# Patient Record
Sex: FEMALE | Race: WHITE | NOT HISPANIC OR LATINO | Employment: STUDENT | URBAN - METROPOLITAN AREA
[De-identification: names, ages, dates, MRNs, and addresses within clinical notes are randomized per-mention and may not be internally consistent; named-entity substitution may affect disease eponyms.]

---
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
## 2024-04-25 ENCOUNTER — NURSE TRIAGE (OUTPATIENT)
Dept: NURSING | Facility: CLINIC | Age: 19
End: 2024-04-25

## 2024-04-25 ENCOUNTER — HOSPITAL ENCOUNTER (EMERGENCY)
Facility: CLINIC | Age: 19
Discharge: HOME | End: 2024-04-26
Attending: EMERGENCY MEDICINE | Admitting: EMERGENCY MEDICINE
Payer: COMMERCIAL

## 2024-04-25 DIAGNOSIS — N93.9 VAGINAL BLEEDING: ICD-10-CM

## 2024-04-25 LAB
ABO/RH(D): NORMAL
ANTIBODY SCREEN: NEGATIVE
SPECIMEN EXPIRATION DATE: NORMAL

## 2024-04-25 PROCEDURE — 99284 EMERGENCY DEPT VISIT MOD MDM: CPT | Performed by: EMERGENCY MEDICINE

## 2024-04-25 PROCEDURE — 99284 EMERGENCY DEPT VISIT MOD MDM: CPT | Mod: 25 | Performed by: EMERGENCY MEDICINE

## 2024-04-25 ASSESSMENT — COLUMBIA-SUICIDE SEVERITY RATING SCALE - C-SSRS
6. HAVE YOU EVER DONE ANYTHING, STARTED TO DO ANYTHING, OR PREPARED TO DO ANYTHING TO END YOUR LIFE?: NO
2. HAVE YOU ACTUALLY HAD ANY THOUGHTS OF KILLING YOURSELF IN THE PAST MONTH?: NO
1. IN THE PAST MONTH, HAVE YOU WISHED YOU WERE DEAD OR WISHED YOU COULD GO TO SLEEP AND NOT WAKE UP?: NO

## 2024-04-26 ENCOUNTER — APPOINTMENT (OUTPATIENT)
Dept: ULTRASOUND IMAGING | Facility: CLINIC | Age: 19
End: 2024-04-26
Attending: EMERGENCY MEDICINE
Payer: COMMERCIAL

## 2024-04-26 VITALS
SYSTOLIC BLOOD PRESSURE: 153 MMHG | HEART RATE: 95 BPM | DIASTOLIC BLOOD PRESSURE: 87 MMHG | TEMPERATURE: 98.1 F | OXYGEN SATURATION: 98 % | RESPIRATION RATE: 20 BRPM

## 2024-04-26 LAB
ALBUMIN SERPL BCG-MCNC: 4.6 G/DL (ref 3.5–5.2)
ALBUMIN UR-MCNC: NEGATIVE MG/DL
ALP SERPL-CCNC: 104 U/L (ref 40–150)
ALT SERPL W P-5'-P-CCNC: 21 U/L (ref 0–50)
ANION GAP SERPL CALCULATED.3IONS-SCNC: 12 MMOL/L (ref 7–15)
APPEARANCE UR: CLEAR
AST SERPL W P-5'-P-CCNC: 32 U/L (ref 0–35)
BASOPHILS # BLD AUTO: 0.1 10E3/UL (ref 0–0.2)
BASOPHILS NFR BLD AUTO: 1 %
BILIRUB SERPL-MCNC: 0.3 MG/DL
BILIRUB UR QL STRIP: NEGATIVE
BUN SERPL-MCNC: 12.8 MG/DL (ref 6–20)
CALCIUM SERPL-MCNC: 9.8 MG/DL (ref 8.6–10)
CHLORIDE SERPL-SCNC: 105 MMOL/L (ref 98–107)
COLOR UR AUTO: ABNORMAL
CREAT SERPL-MCNC: 0.84 MG/DL (ref 0.51–0.95)
DEPRECATED HCO3 PLAS-SCNC: 24 MMOL/L (ref 22–29)
EGFRCR SERPLBLD CKD-EPI 2021: >90 ML/MIN/1.73M2
EOSINOPHIL # BLD AUTO: 0.1 10E3/UL (ref 0–0.7)
EOSINOPHIL NFR BLD AUTO: 1 %
ERYTHROCYTE [DISTWIDTH] IN BLOOD BY AUTOMATED COUNT: 15.4 % (ref 10–15)
GLUCOSE SERPL-MCNC: 149 MG/DL (ref 70–99)
GLUCOSE UR STRIP-MCNC: NEGATIVE MG/DL
HCG SERPL QL: NEGATIVE
HCT VFR BLD AUTO: 37.4 % (ref 35–47)
HGB BLD-MCNC: 11.8 G/DL (ref 11.7–15.7)
HGB UR QL STRIP: NEGATIVE
IMM GRANULOCYTES # BLD: 0 10E3/UL
IMM GRANULOCYTES NFR BLD: 0 %
KETONES UR STRIP-MCNC: NEGATIVE MG/DL
LEUKOCYTE ESTERASE UR QL STRIP: ABNORMAL
LIPASE SERPL-CCNC: 33 U/L (ref 13–60)
LYMPHOCYTES # BLD AUTO: 3.9 10E3/UL (ref 0.8–5.3)
LYMPHOCYTES NFR BLD AUTO: 36 %
MCH RBC QN AUTO: 27.7 PG (ref 26.5–33)
MCHC RBC AUTO-ENTMCNC: 31.6 G/DL (ref 31.5–36.5)
MCV RBC AUTO: 88 FL (ref 78–100)
MONOCYTES # BLD AUTO: 1.1 10E3/UL (ref 0–1.3)
MONOCYTES NFR BLD AUTO: 10 %
MUCOUS THREADS #/AREA URNS LPF: PRESENT /LPF
NEUTROPHILS # BLD AUTO: 5.7 10E3/UL (ref 1.6–8.3)
NEUTROPHILS NFR BLD AUTO: 52 %
NITRATE UR QL: NEGATIVE
NRBC # BLD AUTO: 0 10E3/UL
NRBC BLD AUTO-RTO: 0 /100
PH UR STRIP: 5 [PH] (ref 5–7)
PLATELET # BLD AUTO: 312 10E3/UL (ref 150–450)
POTASSIUM SERPL-SCNC: 4.2 MMOL/L (ref 3.4–5.3)
PROT SERPL-MCNC: 7.7 G/DL (ref 6.4–8.3)
RADIOLOGIST FLAGS: NORMAL
RBC # BLD AUTO: 4.26 10E6/UL (ref 3.8–5.2)
RBC URINE: 2 /HPF
SODIUM SERPL-SCNC: 141 MMOL/L (ref 135–145)
SP GR UR STRIP: 1.01 (ref 1–1.03)
SQUAMOUS EPITHELIAL: 1 /HPF
UROBILINOGEN UR STRIP-MCNC: NORMAL MG/DL
WBC # BLD AUTO: 10.9 10E3/UL (ref 4–11)
WBC URINE: 4 /HPF

## 2024-04-26 PROCEDURE — 83690 ASSAY OF LIPASE: CPT | Performed by: EMERGENCY MEDICINE

## 2024-04-26 PROCEDURE — 76830 TRANSVAGINAL US NON-OB: CPT | Mod: 26 | Performed by: STUDENT IN AN ORGANIZED HEALTH CARE EDUCATION/TRAINING PROGRAM

## 2024-04-26 PROCEDURE — 76856 US EXAM PELVIC COMPLETE: CPT | Mod: 26 | Performed by: STUDENT IN AN ORGANIZED HEALTH CARE EDUCATION/TRAINING PROGRAM

## 2024-04-26 PROCEDURE — 84703 CHORIONIC GONADOTROPIN ASSAY: CPT | Performed by: EMERGENCY MEDICINE

## 2024-04-26 PROCEDURE — 76856 US EXAM PELVIC COMPLETE: CPT

## 2024-04-26 PROCEDURE — 76830 TRANSVAGINAL US NON-OB: CPT

## 2024-04-26 PROCEDURE — 81001 URINALYSIS AUTO W/SCOPE: CPT | Performed by: EMERGENCY MEDICINE

## 2024-04-26 PROCEDURE — 86900 BLOOD TYPING SEROLOGIC ABO: CPT | Performed by: EMERGENCY MEDICINE

## 2024-04-26 PROCEDURE — 36415 COLL VENOUS BLD VENIPUNCTURE: CPT | Performed by: EMERGENCY MEDICINE

## 2024-04-26 PROCEDURE — 85041 AUTOMATED RBC COUNT: CPT | Performed by: EMERGENCY MEDICINE

## 2024-04-26 PROCEDURE — 80053 COMPREHEN METABOLIC PANEL: CPT | Performed by: EMERGENCY MEDICINE

## 2024-04-26 ASSESSMENT — ACTIVITIES OF DAILY LIVING (ADL): ADLS_ACUITY_SCORE: 33

## 2024-04-26 NOTE — DISCHARGE INSTRUCTIONS
Follow-up with your primary care doctor.  If you develop any new or worsening symptoms such as lightheadedness, dizziness, or worsening bleeding you should return the emergency room to be reevaluated.  You can take Motrin or Tylenol for pain.

## 2024-04-26 NOTE — ED TRIAGE NOTES
"Patient presents to ED with CC of abdominal pain and bilateral leg pain almost describes as \"growing pain.\"  Patient states today she had vaginal bleeding but her period ended a week ago. Describes it as \"brighter red blood and thin\" spotting.     Possible chance of pregnancy       Hx of DM1   "

## 2024-04-26 NOTE — TELEPHONE ENCOUNTER
"Nurse Triage SBAR    Is this a 2nd Level Triage? NO    Situation: Unusual Vaginal Bleeding, Abdominal and Leg Pain    Background: :Patient reports that she had her menstrual period for a week and ended a week ago.    Assessment: Patient calling to report constant mild lower abdominal pain and leg pain increasing to 7/10 intermittently. She reports cramping of abdomen and \"growing pains\" in legs. She reports onset of vaginal bleeding this evening, she reports filling a tampon and is now having spotting. She denies dizziness, confusion, severe weakness, or severe abdominal pain.    Protocol Recommended Disposition:   According to the protocol, patient should see provider within 4 hours (or PCP triage), no [facility] PCP, advised to go to ED.  Care advice given. Patient verbalizes understanding and agrees with plan of care. Plans to go to [place] ED.    Allison Stout RN  04/25/24 9:55 PM  [facility] Nurse Advisor    Reason for Disposition   [1] Constant abdominal pain AND [2] present > 2 hours    Additional Information   Negative: Shock suspected (e.g., cold/pale/clammy skin, too weak to stand, low BP, rapid pulse)   Negative: Difficult to awaken or acting confused (e.g., disoriented, slurred speech)   Negative: Passed out (i.e., lost consciousness, collapsed and was not responding)   Negative: Sounds like a life-threatening emergency to the triager   Negative: Followed a genital area injury (e.g., vagina, vulva)   Negative: Pregnant 20 or more weeks   Negative: Pregnant < 20 weeks  (less than 5 months)   Negative: Postpartum (from 0 to 6 weeks after delivery)   Negative: Bleeding occurring > 12 months after menopause   Negative: Bleeding from sexual abuse or rape   Negative: [1] Vaginal discharge is main symptom AND [2] small amount of blood   Negative: SEVERE abdominal pain   Negative: SEVERE dizziness (e.g., unable to stand, requires support to walk, feels like passing out now)   Negative: SEVERE vaginal " bleeding (e.g., soaking 2 pads or tampons per hour and present 2 or more hours; 1 menstrual cup every 2 hours)   Negative: Patient sounds very sick or weak to the triager   Negative: MODERATE vaginal bleeding (e.g., soaking 1 pad or tampon per hour and present > 6 hours; 1 menstrual cup every 6 hours)    Protocols used: Vaginal Bleeding - [id number]

## 2024-04-26 NOTE — ED PROVIDER NOTES
[facility] DEPARTMENT (Formerly [facility])    4/25/24       ED PROVIDER NOTE    History     Chief Complaint   Patient presents with    Abdominal Pain    Vaginal Bleeding     HPI  Alicia Mayers is a 18 year old female with PMH notable for DM1 who presents to the Emergency Department with abdominal pain, bilateral leg pain, and vaginal bleeding.  The patient reports about 4 days of intermittent lower abdominal cramps, bilateral leg achiness.  She has been taking ibuprofen for symptoms intermittently.  She states that then today she developed some vaginal bleeding which initially started off heavy but then became lighter and more spotting like.    She states that her periods are regular and her last period was about 1 week ago.  She is not on birth control.  She is sexually active.  She denies concern for STDs, denies any discharge.  She denies any recent fevers, chills, nausea.  She denies any urinary complaints.    Past Medical History  History reviewed. No pertinent past medical history.  History reviewed. No pertinent surgical history.  No current outpatient medications on file.    No Known Allergies  Family History  History reviewed. No pertinent family history.  Social History   Social History     Tobacco Use    Smoking status: Never    Smokeless tobacco: Never   Substance Use Topics    Alcohol use: Never     Comment: occasionally    Drug use: Never         A complete review of systems was performed with pertinent positives and negatives noted in the HPI, and all other systems negative.    Physical Exam   BP: (!) 153/87  Pulse: 100  Temp: 98.1  F (36.7  C)  Resp: 16  SpO2: 99 %  Physical Exam  Constitutional:       General: She is not in acute distress.     Appearance: She is not toxic-appearing.   HENT:      Nose: Nose normal. No congestion.      Mouth/Throat:      Mouth: Mucous membranes are moist.      Pharynx: Oropharynx is clear.   Eyes:      Extraocular Movements: Extraocular movements  intact.      Pupils: Pupils are equal, round, and reactive to light.   Cardiovascular:      Rate and Rhythm: Normal rate and regular rhythm.      Heart sounds: No murmur heard.     No gallop.   Pulmonary:      Effort: Pulmonary effort is normal. No respiratory distress.      Breath sounds: No wheezing or rales.   Abdominal:      General: There is no distension.      Palpations: Abdomen is soft.      Tenderness: There is no abdominal tenderness. There is no rebound.   Musculoskeletal:         General: Normal range of motion.      Cervical back: Normal range of motion.   Skin:     General: Skin is warm and dry.   Neurological:      General: No focal deficit present.      Mental Status: She is alert and oriented to person, place, and time.           ED Course, Procedures, & Data      Procedures               Results for orders placed or performed during the hospital encounter of 04/25/24   US Pelvic Complete with Transvaginal     Status: None (Preliminary result)    Impression    RESIDENT PRELIMINARY INTERPRETATION  IMPRESSION:   1. No ovarian torsion.  2. Endometrial stripe measures 11 mm, which may correspond with  secretory phase.   HCG qualitative pregnancy (blood)     Status: Normal   Result Value Ref Range    hCG Serum Qualitative Negative Negative   Comprehensive metabolic panel     Status: Abnormal   Result Value Ref Range    Sodium 141 135 - 145 mmol/L    Potassium 4.2 3.4 - 5.3 mmol/L    Carbon Dioxide (CO2) 24 22 - 29 mmol/L    Anion Gap 12 7 - 15 mmol/L    Urea Nitrogen 12.8 6.0 - 20.0 mg/dL    Creatinine 0.84 0.51 - 0.95 mg/dL    GFR Estimate >90 >60 mL/min/1.73m2    Calcium 9.8 8.6 - 10.0 mg/dL    Chloride 105 98 - 107 mmol/L    Glucose 149 (H) 70 - 99 mg/dL    Alkaline Phosphatase 104 40 - 150 U/L    AST 32 0 - 35 U/L    ALT 21 0 - 50 U/L    Protein Total 7.7 6.4 - 8.3 g/dL    Albumin 4.6 3.5 - 5.2 g/dL    Bilirubin Total 0.3 <=1.2 mg/dL   Lipase     Status: Normal   Result Value Ref Range    Lipase 33  13 - 60 U/L   UA with Microscopic reflex to Culture     Status: Abnormal    Specimen: Urine, Clean Catch   Result Value Ref Range    Color Urine Straw Colorless, Straw, Light Yellow, Yellow    Appearance Urine Clear Clear    Glucose Urine Negative Negative mg/dL    Bilirubin Urine Negative Negative    Ketones Urine Negative Negative mg/dL    Specific Gravity Urine 1.013 1.003 - 1.035    Blood Urine Negative Negative    pH Urine 5.0 5.0 - 7.0    Protein Albumin Urine Negative Negative mg/dL    Urobilinogen Urine Normal Normal, 2.0 mg/dL    Nitrite Urine Negative Negative    Leukocyte Esterase Urine Trace (A) Negative    Mucus Urine Present (A) None Seen /LPF    RBC Urine 2 <=2 /HPF    WBC Urine 4 <=5 /HPF    Squamous Epithelials Urine 1 <=1 /HPF    Narrative    Urine Culture not indicated   CBC with platelets and differential     Status: Abnormal   Result Value Ref Range    WBC Count 10.9 4.0 - 11.0 10e3/uL    RBC Count 4.26 3.80 - 5.20 10e6/uL    Hemoglobin 11.8 11.7 - 15.7 g/dL    Hematocrit 37.4 35.0 - 47.0 %    MCV 88 78 - 100 fL    MCH 27.7 26.5 - 33.0 pg    MCHC 31.6 31.5 - 36.5 g/dL    RDW 15.4 (H) 10.0 - 15.0 %    Platelet Count 312 150 - 450 10e3/uL    % Neutrophils 52 %    % Lymphocytes 36 %    % Monocytes 10 %    % Eosinophils 1 %    % Basophils 1 %    % Immature Granulocytes 0 %    NRBCs per 100 WBC 0 <1 /100    Absolute Neutrophils 5.7 1.6 - 8.3 10e3/uL    Absolute Lymphocytes 3.9 0.8 - 5.3 10e3/uL    Absolute Monocytes 1.1 0.0 - 1.3 10e3/uL    Absolute Eosinophils 0.1 0.0 - 0.7 10e3/uL    Absolute Basophils 0.1 0.0 - 0.2 10e3/uL    Absolute Immature Granulocytes 0.0 <=0.4 10e3/uL    Absolute NRBCs 0.0 10e3/uL   Adult Type and Screen     Status: None (Preliminary result)   Result Value Ref Range    ABO/RH(D) O POS     Antibody Screen Negative Negative    SPECIMEN EXPIRATION DATE [id number]    CBC with platelets differential     Status: Abnormal    Narrative    The following orders were created for  panel order CBC with platelets differential.  Procedure                               Abnormality         Status                     ---------                               -----------         ------                     CBC with platelets and d...[[id number]]  Abnormal            Final result                 Please view results for these tests on the individual orders.   ABO/Rh type and screen     Status: None (In process)    Narrative    The following orders were created for panel order ABO/Rh type and screen.  Procedure                               Abnormality         Status                     ---------                               -----------         ------                     Adult Type and Screen[[id number]]                            Preliminary result           Please view results for these tests on the individual orders.     Medications - No data to display  Labs Ordered and Resulted from Time of ED Arrival to Time of ED Departure   COMPREHENSIVE METABOLIC PANEL - Abnormal       Result Value    Sodium 141      Potassium 4.2      Carbon Dioxide (CO2) 24      Anion Gap 12      Urea Nitrogen 12.8      Creatinine 0.84      GFR Estimate >90      Calcium 9.8      Chloride 105      Glucose 149 (*)     Alkaline Phosphatase 104      AST 32      ALT 21      Protein Total 7.7      Albumin 4.6      Bilirubin Total 0.3     ROUTINE UA WITH MICROSCOPIC REFLEX TO CULTURE - Abnormal    Color Urine Straw      Appearance Urine Clear      Glucose Urine Negative      Bilirubin Urine Negative      Ketones Urine Negative      Specific Gravity Urine 1.013      Blood Urine Negative      pH Urine 5.0      Protein Albumin Urine Negative      Urobilinogen Urine Normal      Nitrite Urine Negative      Leukocyte Esterase Urine Trace (*)     Mucus Urine Present (*)     RBC Urine 2      WBC Urine 4      Squamous Epithelials Urine 1     CBC WITH PLATELETS AND DIFFERENTIAL - Abnormal    WBC Count 10.9      RBC Count 4.26      Hemoglobin  11.8      Hematocrit 37.4      MCV 88      MCH 27.7      MCHC 31.6      RDW 15.4 (*)     Platelet Count 312      % Neutrophils 52      % Lymphocytes 36      % Monocytes 10      % Eosinophils 1      % Basophils 1      % Immature Granulocytes 0      NRBCs per 100 WBC 0      Absolute Neutrophils 5.7      Absolute Lymphocytes 3.9      Absolute Monocytes 1.1      Absolute Eosinophils 0.1      Absolute Basophils 0.1      Absolute Immature Granulocytes 0.0      Absolute NRBCs 0.0     HCG QUALITATIVE PREGNANCY - Normal    hCG Serum Qualitative Negative     LIPASE - Normal    Lipase 33     TYPE AND SCREEN, ADULT    ABO/RH(D) O POS      Antibody Screen Negative      SPECIMEN EXPIRATION DATE [id number]     ABO/RH TYPE AND SCREEN     US Pelvic Complete with Transvaginal   Preliminary Result   RESIDENT PRELIMINARY INTERPRETATION   IMPRESSION:    1. No ovarian torsion.   2. Endometrial stripe measures 11 mm, which may correspond with   secretory phase.             Critical care was not performed.     Medical Decision Making  The patient's presentation was of moderate complexity (an undiagnosed new problem with uncertain diagnosis).    The patient's evaluation involved:  ordering and/or review of 3+ test(s) in this encounter (see separate area of note for details)    The patient's management necessitated only low risk treatment.    Assessment & Plan    This is a 18-year-old female presenting with vaginal bleeding.  Overall she was well-appearing.  Her abdominal exam showed no significant tenderness and the remainder of her exam was similarly nonfocal.  Blood work was obtained as well as a pelvic ultrasound.     Her blood work was notable for hemoglobin within normal limits.  CMP, lipase were not suggestive of other acute process.  Her hCG was negative.  UA without signs of UTI.  Her pelvic ultrasound showed a thickened endometrial stripe but no other acute findings.    These results were discussed with the patient I  recommended she follow-up with her primary care doctor or OB.  Return precautions were discussed and all questions were answered.  She was discharged stable condition.    I have reviewed the nursing notes. I have reviewed the findings, diagnosis, plan and need for follow up with the patient.    New Prescriptions    No medications on file       Final diagnoses:   Vaginal bleeding       Vitaliy Ledbetter MD  [facility] EMERGENCY DEPARTMENT  4/25/2024        Vitaliy Ledbetter MD  04/26/24 0202

## 2024-05-08 ENCOUNTER — APPOINTMENT (OUTPATIENT)
Dept: LAB | Age: 19
End: 2024-05-08

## 2024-05-13 ENCOUNTER — APPOINTMENT (OUTPATIENT)
Dept: OCCUPATIONAL MEDICINE | Age: 19
End: 2024-05-13

## 2024-05-13 DIAGNOSIS — Z00.8 HEALTH EXAMINATION IN POPULATION SURVEY: Primary | ICD-10-CM

## 2024-05-13 PROCEDURE — OH123 RAPID TEST DRUG KIT & COLLECTION 5 PANEL: Performed by: PHYSICIAN ASSISTANT

## 2024-07-28 ENCOUNTER — HEALTH MAINTENANCE LETTER (OUTPATIENT)
Age: 19
End: 2024-07-28

## 2024-10-21 ENCOUNTER — OFFICE VISIT (OUTPATIENT)
Dept: MIDWIFE SERVICES | Facility: CLINIC | Age: 19
End: 2024-10-21
Payer: COMMERCIAL

## 2024-10-21 VITALS
HEART RATE: 102 BPM | OXYGEN SATURATION: 98 % | SYSTOLIC BLOOD PRESSURE: 145 MMHG | WEIGHT: 162.9 LBS | TEMPERATURE: 98 F | DIASTOLIC BLOOD PRESSURE: 86 MMHG

## 2024-10-21 DIAGNOSIS — Z30.430 ENCOUNTER FOR INSERTION OF INTRAUTERINE CONTRACEPTIVE DEVICE: ICD-10-CM

## 2024-10-21 DIAGNOSIS — Z30.430 ENCOUNTER FOR IUD INSERTION: Primary | ICD-10-CM

## 2024-10-21 LAB — HCG UR QL: NEGATIVE

## 2024-10-21 PROCEDURE — 58300 INSERT INTRAUTERINE DEVICE: CPT | Performed by: ADVANCED PRACTICE MIDWIFE

## 2024-10-21 PROCEDURE — 81025 URINE PREGNANCY TEST: CPT | Performed by: ADVANCED PRACTICE MIDWIFE

## 2024-10-21 NOTE — PROGRESS NOTES
IUD Insertion:  CONSULT:    Is a pregnancy test required: Yes.  Was it positive or negative?  Negative  Was a consent obtained?  Yes    Subjective: Alicia Mayers is a 18 year old No obstetric history on file. presents for IUD and desires Mirena type IUD.    Patient has been given the opportunity to ask questions about all forms of birth control, including all options appropriate for Alicia Mayers. Discussed that no method of birth control, except abstinence is 100% effective against pregnancy or sexually transmitted infection.     Alicia Mayers understands she may have the IUD removed at any time. IUD should be removed by a health care provider.    The entire insertion procedure was reviewed with the patient, including care after placement.    Patient's last menstrual period was 09/18/2024. Last sexual activity: >14 days . No allergy to betadine or shellfish. Recent STD screening  hCG Urine Qualitative   Date Value Ref Range Status   10/21/2024 Negative Negative Final     Comment:     This test is for screening purposes.  Results should be interpreted along with the clinical picture.  Confirmation testing is available if warranted by ordering [id number], HCG Quantitative Pregnancy.         BP (!) 145/86   Pulse 102   Temp 98  F (36.7  C) (Oral)   Wt 73.9 kg (162 lb 14.4 oz)   LMP 09/18/2024   SpO2 98%   Breastfeeding No     Pelvic Exam:   EG/BUS: normal genital architecture without lesions, erythema or abnormal secretions.   Vagina: moist, pink, rugae with physiologic discharge and secretions  Cervix: nulliparous no lesions and pink, moist, closed, without lesion or CMT  Uterus: antevertedposition, mobile, no pain  Adnexa: within normal limits and no masses, nodularity, tenderness    PROCEDURE NOTE: -- IUD Insertion    Reason for Insertion: contraception    Paracervical block performed.  Under sterile technique, cervix was visualized with speculum and prepped with Betadine solution swab x 3.  Tenaculum was placed for stability. The uterus was gently straightened and sounded to 8.0 cm. IUD prepared for placement, and IUD inserted according to 's instructions without difficulty or significant resitance, and deployed at the fundus. The strings were visualized and trimmed to 3.5 cm from the external os. Tenaculum was removed and hemostasis noted. Speculum removed.  Patient tolerated procedure well.    EBL: minimal    Complications: none    ASSESSMENT:     ICD-10-CM    1. Encounter for IUD insertion  Z30.430 HCG Qual, Urine ([id number])      2. Encounter for insertion of intrauterine contraceptive device  Z30.430              PLAN:    Given 's handouts, including when to have IUD removed, list of danger s/sx, side effects and follow up recommended. Encouraged condom use for prevention of STD. Back up contraception advised for 7 days if progestin method. Advised to call for any fever, for prolonged or severe pain or bleeding, abnormal vaginal discharge, or unable to palpate strings. She was advised to use pain medications (ibuprofen) as needed for mild to moderate pain. Advised to follow-up in clinic in 4-6 weeks for IUD string check if unable to find strings or as directed by provider.     QIANA Velasquez CNM

## 2024-11-10 ENCOUNTER — MYC MEDICAL ADVICE (OUTPATIENT)
Dept: MIDWIFE SERVICES | Facility: CLINIC | Age: 19
End: 2024-11-10
Payer: COMMERCIAL

## 2024-11-10 DIAGNOSIS — Z30.431 IUD CHECK UP: Primary | ICD-10-CM

## 2024-11-11 NOTE — TELEPHONE ENCOUNTER
Pt had IUD inserted on 10/21 and pt hasn't been able to feel strings  Pt having worsened bleeding and period cramping so wondering if she should be concerned/have US?  Pended US  Routing to provider to advise.  Luanne Smart RN on 11/18/2024 at 10:33 AM

## 2024-11-18 NOTE — CONFIDENTIAL NOTE
I would leave the appointment as is, and if needed at that appointment we can get the ultrasound scheduled. No need to schedule it now. Thanks, Davina James, QIANA CORONA

## 2025-02-06 ENCOUNTER — TELEPHONE (OUTPATIENT)
Dept: MIDWIFE SERVICES | Facility: CLINIC | Age: 20
End: 2025-02-06
Payer: COMMERCIAL

## 2025-02-06 ENCOUNTER — MYC MEDICAL ADVICE (OUTPATIENT)
Dept: MIDWIFE SERVICES | Facility: CLINIC | Age: 20
End: 2025-02-06
Payer: COMMERCIAL

## 2025-02-06 NOTE — TELEPHONE ENCOUNTER
CNM okay with patient skipping appointment as long as she can feel her IUD strings and has no further concerns     Called pt to let her know, No answer [facility]    Sent [employer] notification to follow up     Coral Zeng RN on 2/6/2025 at 12:29 PM

## 2025-02-06 NOTE — TELEPHONE ENCOUNTER
Mirena placed 10/21/24     Pt reached out 11/10 about not being able to feel strings and spotting  -appointment made for December (pt no showed)    Pt called today to see if she needs US to check the placement of IUD or appointment to check     Pt reports  - no longer spotting/bleeding  -no cramps  -can feel IUD strings  - no concerns    Is a follow up string check or US still applicable or is she okay to monitor  Routing to provider to advise.      Coral Zeng RN on 2/6/2025 at 11:30 AM

## 2025-06-29 ENCOUNTER — HEALTH MAINTENANCE LETTER (OUTPATIENT)
Age: 20
End: 2025-06-29